# Patient Record
Sex: FEMALE | URBAN - METROPOLITAN AREA
[De-identification: names, ages, dates, MRNs, and addresses within clinical notes are randomized per-mention and may not be internally consistent; named-entity substitution may affect disease eponyms.]

---

## 2022-08-31 ENCOUNTER — OFFICE VISIT (OUTPATIENT)
Dept: URBAN - METROPOLITAN AREA CLINIC 88 | Facility: CLINIC | Age: 72
End: 2022-08-31
Payer: MEDICARE

## 2022-08-31 DIAGNOSIS — H25.13 AGE-RELATED NUCLEAR CATARACT, BILATERAL: ICD-10-CM

## 2022-08-31 DIAGNOSIS — H04.123 DRY EYE SYNDROME OF BILATERAL LACRIMAL GLANDS: Primary | ICD-10-CM

## 2022-08-31 DIAGNOSIS — H52.03 HYPERMETROPIA, BILATERAL: ICD-10-CM

## 2022-08-31 PROCEDURE — 92004 COMPRE OPH EXAM NEW PT 1/>: CPT | Performed by: OPTOMETRIST

## 2022-08-31 ASSESSMENT — VISUAL ACUITY
OD: 20/20
OS: 20/25

## 2022-08-31 ASSESSMENT — INTRAOCULAR PRESSURE
OS: 13
OD: 13

## 2022-08-31 NOTE — IMPRESSION/PLAN
Impression: Hypermetropia, bilateral: H52.03. Plan: Reviewed refractive prescription in detail with patient and need for glasses to improve vision at this time. Discussed lens options and designs. Ok to release spectacle prescription. Since the change to CLs would not make a significant difference (as demonstrated with her CL trial on OD), she elected to continue with her same prescription for now.

## 2022-08-31 NOTE — IMPRESSION/PLAN
Impression: Dry eye syndrome of bilateral lacrimal glands: H04.123. Plan: Discussed condition in detail with patient and that it can be exacerbated by autoimmune disease. Explained condition does not have a cure and will need artificial tears for maintenance. Recommended artificial tears (Refresh or Systane brand) TID-QID OU. If symptoms persist, consider restasis and/or punctal occlusion.